# Patient Record
Sex: MALE | Race: OTHER | NOT HISPANIC OR LATINO | URBAN - METROPOLITAN AREA
[De-identification: names, ages, dates, MRNs, and addresses within clinical notes are randomized per-mention and may not be internally consistent; named-entity substitution may affect disease eponyms.]

---

## 2019-03-20 ENCOUNTER — EMERGENCY (EMERGENCY)
Facility: HOSPITAL | Age: 64
LOS: 1 days | Discharge: HOME | End: 2019-03-20
Attending: EMERGENCY MEDICINE
Payer: COMMERCIAL

## 2019-03-20 VITALS
RESPIRATION RATE: 18 BRPM | OXYGEN SATURATION: 96 % | WEIGHT: 229.94 LBS | HEART RATE: 78 BPM | DIASTOLIC BLOOD PRESSURE: 64 MMHG | SYSTOLIC BLOOD PRESSURE: 132 MMHG | TEMPERATURE: 98 F | HEIGHT: 74 IN

## 2019-03-20 VITALS
TEMPERATURE: 99 F | DIASTOLIC BLOOD PRESSURE: 89 MMHG | OXYGEN SATURATION: 96 % | SYSTOLIC BLOOD PRESSURE: 136 MMHG | HEART RATE: 73 BPM | RESPIRATION RATE: 18 BRPM

## 2019-03-20 LAB
ALBUMIN SERPL ELPH-MCNC: 4.5 G/DL — SIGNIFICANT CHANGE UP (ref 3.3–5)
ALP SERPL-CCNC: 88 U/L — SIGNIFICANT CHANGE UP (ref 40–120)
ALT FLD-CCNC: 47 U/L — HIGH (ref 10–45)
ANION GAP SERPL CALC-SCNC: 13 MMOL/L — SIGNIFICANT CHANGE UP (ref 5–17)
AST SERPL-CCNC: 42 U/L — HIGH (ref 10–40)
BASOPHILS # BLD AUTO: 0 K/UL — SIGNIFICANT CHANGE UP (ref 0–0.2)
BASOPHILS NFR BLD AUTO: 0.5 % — SIGNIFICANT CHANGE UP (ref 0–2)
BILIRUB SERPL-MCNC: 0.2 MG/DL — SIGNIFICANT CHANGE UP (ref 0.2–1.2)
BUN SERPL-MCNC: 13 MG/DL — SIGNIFICANT CHANGE UP (ref 7–23)
CALCIUM SERPL-MCNC: 9.7 MG/DL — SIGNIFICANT CHANGE UP (ref 8.4–10.5)
CHLORIDE SERPL-SCNC: 100 MMOL/L — SIGNIFICANT CHANGE UP (ref 96–108)
CO2 SERPL-SCNC: 25 MMOL/L — SIGNIFICANT CHANGE UP (ref 22–31)
CREAT SERPL-MCNC: 0.66 MG/DL — SIGNIFICANT CHANGE UP (ref 0.5–1.3)
EOSINOPHIL # BLD AUTO: 0.2 K/UL — SIGNIFICANT CHANGE UP (ref 0–0.5)
EOSINOPHIL NFR BLD AUTO: 4.2 % — SIGNIFICANT CHANGE UP (ref 0–6)
GLUCOSE SERPL-MCNC: 202 MG/DL — HIGH (ref 70–99)
HCT VFR BLD CALC: 41.5 % — SIGNIFICANT CHANGE UP (ref 39–50)
HGB BLD-MCNC: 14.6 G/DL — SIGNIFICANT CHANGE UP (ref 13–17)
LYMPHOCYTES # BLD AUTO: 2 K/UL — SIGNIFICANT CHANGE UP (ref 1–3.3)
LYMPHOCYTES # BLD AUTO: 36.5 % — SIGNIFICANT CHANGE UP (ref 13–44)
MCHC RBC-ENTMCNC: 31.1 PG — SIGNIFICANT CHANGE UP (ref 27–34)
MCHC RBC-ENTMCNC: 35.2 GM/DL — SIGNIFICANT CHANGE UP (ref 32–36)
MCV RBC AUTO: 88.6 FL — SIGNIFICANT CHANGE UP (ref 80–100)
MONOCYTES # BLD AUTO: 0.5 K/UL — SIGNIFICANT CHANGE UP (ref 0–0.9)
MONOCYTES NFR BLD AUTO: 8.8 % — SIGNIFICANT CHANGE UP (ref 2–14)
NEUTROPHILS # BLD AUTO: 2.7 K/UL — SIGNIFICANT CHANGE UP (ref 1.8–7.4)
NEUTROPHILS NFR BLD AUTO: 50 % — SIGNIFICANT CHANGE UP (ref 43–77)
PLATELET # BLD AUTO: 183 K/UL — SIGNIFICANT CHANGE UP (ref 150–400)
POTASSIUM SERPL-MCNC: 5 MMOL/L — SIGNIFICANT CHANGE UP (ref 3.5–5.3)
POTASSIUM SERPL-SCNC: 5 MMOL/L — SIGNIFICANT CHANGE UP (ref 3.5–5.3)
PROT SERPL-MCNC: 8.1 G/DL — SIGNIFICANT CHANGE UP (ref 6–8.3)
RBC # BLD: 4.69 M/UL — SIGNIFICANT CHANGE UP (ref 4.2–5.8)
RBC # FLD: 12.8 % — SIGNIFICANT CHANGE UP (ref 10.3–14.5)
SODIUM SERPL-SCNC: 138 MMOL/L — SIGNIFICANT CHANGE UP (ref 135–145)
TROPONIN T, HIGH SENSITIVITY RESULT: <6 NG/L — SIGNIFICANT CHANGE UP (ref 0–51)
WBC # BLD: 5.4 K/UL — SIGNIFICANT CHANGE UP (ref 3.8–10.5)
WBC # FLD AUTO: 5.4 K/UL — SIGNIFICANT CHANGE UP (ref 3.8–10.5)

## 2019-03-20 PROCEDURE — 70450 CT HEAD/BRAIN W/O DYE: CPT | Mod: 26

## 2019-03-20 PROCEDURE — 85027 COMPLETE CBC AUTOMATED: CPT

## 2019-03-20 PROCEDURE — 93005 ELECTROCARDIOGRAM TRACING: CPT

## 2019-03-20 PROCEDURE — 70450 CT HEAD/BRAIN W/O DYE: CPT

## 2019-03-20 PROCEDURE — 71250 CT THORAX DX C-: CPT | Mod: 26

## 2019-03-20 PROCEDURE — 99284 EMERGENCY DEPT VISIT MOD MDM: CPT | Mod: 25

## 2019-03-20 PROCEDURE — 71250 CT THORAX DX C-: CPT

## 2019-03-20 PROCEDURE — 84484 ASSAY OF TROPONIN QUANT: CPT

## 2019-03-20 PROCEDURE — 80053 COMPREHEN METABOLIC PANEL: CPT

## 2019-03-20 PROCEDURE — 93010 ELECTROCARDIOGRAM REPORT: CPT

## 2019-03-20 RX ORDER — VALACYCLOVIR 500 MG/1
1 TABLET, FILM COATED ORAL
Qty: 21 | Refills: 0 | OUTPATIENT
Start: 2019-03-20 | End: 2019-03-26

## 2019-03-20 RX ORDER — ACETAMINOPHEN 500 MG
975 TABLET ORAL ONCE
Qty: 0 | Refills: 0 | Status: COMPLETED | OUTPATIENT
Start: 2019-03-20 | End: 2019-03-20

## 2019-03-20 RX ADMIN — Medication 975 MILLIGRAM(S): at 18:32

## 2019-03-20 NOTE — ED PROVIDER NOTE - CARE PLAN
Principal Discharge DX:	Facial weakness  Assessment and plan of treatment:	Take prednisone 60mg (three 20mg tabs) once daily plus valacyclovir 1000mg three times daily for one week.  Take your home medications as prescribed.   Follow up with your Primary Care Provider upon discharge.   Return to ER for any new/worsening chest pain, shortness of breath, weakness, numbness, vision changes, trouble speaking/walking, new/worsening headache, or any other concerns.  Secondary Diagnosis:	Bell palsy

## 2019-03-20 NOTE — ED PROVIDER NOTE - NSFOLLOWUPINSTRUCTIONS_ED_ALL_ED_FT
Take prednisone 60mg (three 20mg tabs) once daily plus valacyclovir 1000mg three times daily for one week.  Take your home medications as prescribed.   Follow up with your Primary Care Provider upon discharge. May follow up with [facility] as needed: call [phone] to make appointment.   Return to ER for any new/worsening chest pain, shortness of breath, weakness, numbness, vision changes, trouble speaking/walking, new/worsening headache, or any other concerns.

## 2019-03-20 NOTE — ED PROVIDER NOTE - PROGRESS NOTE DETAILS
Trop < 6. CTH and CT chest unremarkable. Will treat for Talisheek palsy and d/c to f/u with PMD. D/w Dr. Bronson. - IDRIS MagallanesC

## 2019-03-20 NOTE — ED PROVIDER NOTE - ATTENDING CONTRIBUTION TO CARE
62 yo male p/w L facial weakness/numbness/drooling since 11pm last night along with constant mild L chest pain radiating to L shoulder/neck/occiput.  Exam c/w Bell's palsy though does not have complete forehead involvement (able to partially furrow his brow).  5/5 in b/l U/LE, ambulatory without assistance, no dysmetria.  CT head unremarkable, CT chest unremarkable.  Pending trop -- will either clear based on single negative trop or delta trop rule-out, and if able to do so, will d/c with antivirals/steroids and have patient f/u with his PCP.  Not aortic dissection, not CVA, not Pancoast tumor, not PE. 62 yo male p/w L facial weakness/numbness/drooling since 11pm last night along with constant mild L chest pain radiating to L shoulder/neck/occiput.  Patient had URI symptoms 1-2 weeks prior to developing the facial weakness.  Exam c/w Bell's palsy; otherwise,  5/5 in b/l U/LE, ambulatory without assistance, no dysmetria.  CT head unremarkable, CT chest unremarkable.  Pending trop -- will either clear based on single negative trop or delta trop rule-out, and if able to do so, will d/c with antivirals/steroids and have patient f/u with his PCP.  Not aortic dissection, not CVA, not Pancoast tumor, not PE.

## 2019-03-20 NOTE — ED PROVIDER NOTE - OBJECTIVE STATEMENT
64yo M with PMH DM, HLD, prostate CA (on lupron) presenting with HA, L facial weakness, and CP since last night. Pt reports he was laying down when he felt L-sided dull occipital HA. Pt then noticed L facial droop and L eye tearing. Pt also reports onset of dull, constant, L sided CP radiating to L shoulder at the same time. Reports HA is currently 7/10 and CP 6/10, has not taken anything for pain. 64yo M with PMH DM, HLD, prostate CA (on lupron) presenting with HA, L facial weakness, and CP since last night. Pt reports he was laying down when he felt L-sided dull occipital HA. Pt then noticed L facial droop and L eye tearing. Pt also reports onset of dull, constant, L sided CP radiating to L shoulder at the same time,+ associated SOB. CP is nonexertional and not pleuritic. Reports HA is currently 7/10 and CP 6/10, has not taken anything for pain. Also reports he feels lightheaded/unsteady when walking. Of note, reports he had a "cold" last week with cough for 3 days which has resolved. Denies any fever/chills, vision changes, numbness/tingling, abd pain, n/v.     PMD Guera Petersen 64yo M with PMH DM, HLD, prostate CA (on lupron) presenting with HA, L facial weakness, and CP since last night. Pt reports he was laying down when he felt L-sided dull occipital HA. Pt then noticed L facial droop and L eye tearing. Pt also reports onset of dull, constant, L sided CP radiating to L shoulder at the same time,+ associated SOB. CP is nonexertional and not pleuritic. Reports HA is currently 7/10 and CP 6/10, has not taken anything for pain. Also reports he feels lightheaded. Of note, reports he had a "cold" last week with cough for 3 days which has resolved. Denies any fever/chills, vision changes, numbness/tingling, abd pain, n/v.     PMD Guera Petersen

## 2019-03-20 NOTE — ED ADULT NURSE NOTE - OBJECTIVE STATEMENT
64 y/o M, A&Ox4, enters ED w/ c/o chest pain, headache and L. sided facial droop/eye droop since last night. Pt. reports he developed L. sided facial/eye droop accompanied by eye tearing, L. sided occipital headache and chest pain which pt. describes as "constant and dull" w/ radiation to the L. upper back and L. shoulder which started last night when pt. was laying down. Pt. reports chest pain and headache still persist. Nothing makes it better or worse. No SOB. No leg swelling/leg pain. Lung sounds clear bilaterally. No recent travel. Pt. does however endorse dry cough. No fever/chills. Pt. presents w/ L. sided facial/eye droop still present. No numbness/tingling. Positive strength and sensation to all extremities. Pupils 3 mm in size, PERRL. No abdominal pain. No n/v. Skin warm, dry and intact. EKG done. Pt. placed on CM - NSR to the 70s. Safety and comfort provided. Family at bedside. 62 y/o M, A&Ox4, enters ED w/ c/o chest pain, headache and L. sided facial droop/eye droop since last night. Pt. reports he developed L. sided facial/eye droop accompanied by eye tearing, L. sided occipital headache and chest pain which pt. describes as "constant and dull" w/ radiation to the L. upper back and L. shoulder which started last night when pt. was laying down. Pt. reports chest pain and headache still persist. Nothing makes it better or worse. No SOB. No leg swelling/leg pain. Lung sounds clear bilaterally. No recent travel. Pt. does however endorse dry cough. No fever/chills. Pt. presents w/ L. sided facial/eye droop still present. No numbness/tingling. Positive strength and sensation to all extremities. Pupils 3 mm in size, PERRL. No abdominal pain. No n/v. Skin warm, dry and intact. EKG done. Pt. placed on CM - NSR to the 70s. Neuro flow sheet initiated and in chart. Dysphagia screen completed and documented in [facility] - pt. passed. Safety and comfort provided. Family at bedside.

## 2019-03-20 NOTE — ED PROVIDER NOTE - PLAN OF CARE
Take prednisone 60mg (three 20mg tabs) once daily plus valacyclovir 1000mg three times daily for one week.  Take your home medications as prescribed.   Follow up with your Primary Care Provider upon discharge.   Return to ER for any new/worsening chest pain, shortness of breath, weakness, numbness, vision changes, trouble speaking/walking, new/worsening headache, or any other concerns.

## 2019-09-09 NOTE — ED PROVIDER NOTE - NS ED MD EM SELECTION
Sepsis     To treat sepsis, antibiotics and fluids may by given through an intravenous (IV) line.     Sepsis happens when your body responds with widespread inflammation to a bad infection or bacteremia--the presence of bacteria in your bloodstream. Sepsis can be deadly. Blood pressure may drop and the lungs and kidneys may start to fail. Emergency care for sepsis is crucial.  Risk factors  Those most at risk for sepsis are:  · Infants or older adults  · People who have an illness that weakens their immune system, such as cancer, AIDS, or diabetes  · People being treated with chemotherapy medicines or radiation, which weakens the immune system  · People who have had a transplant  · People with a very severe infection such as pneumonia, meningitis, or a urinary tract infection  When to go to the emergency department (ED)  Sepsis is an emergency. Go to the nearest ED if you have a fever with any of these symptoms:  · Chills and shaking  · Rapid heartbeat and breathing  · Trouble breathing  · Severe nausea or uncontrolled vomiting  · Confusion, disorientation, drowsiness, or dizziness  · Decreased urination  · Severe pain, including in the back or joints   What to expect in the ED  · Blood and urine tests are done to look for bacteria. They also check for organ failure.  · Blood, urine, or sputum cultures may be taken. The samples are sent to a lab. They are placed in a special container. Any bacteria should grow in 24 hours.  · X-rays or other imaging tests may be done.  A person with sepsis will be admitted to the hospital and treated with antibiotics. Treatment may also include oxygen and intravenous fluids.  Date Last Reviewed: 10/1/2016  © 8437-0187 [employer]. [address] [place] [address], [place] [address]. All rights reserved. This information is not intended as a substitute for professional medical care. Always follow your healthcare professional's instructions.        
[id number] Comprehensive

## 2022-02-02 ENCOUNTER — RESULT REVIEW (OUTPATIENT)
Age: 67
End: 2022-02-02

## 2022-04-29 ENCOUNTER — OUTPATIENT (OUTPATIENT)
Dept: OUTPATIENT SERVICES | Facility: HOSPITAL | Age: 67
LOS: 1 days | End: 2022-04-29

## 2022-04-29 ENCOUNTER — APPOINTMENT (OUTPATIENT)
Dept: DISASTER EMERGENCY | Facility: HOSPITAL | Age: 67
End: 2022-04-29

## 2022-04-29 ENCOUNTER — TRANSCRIPTION ENCOUNTER (OUTPATIENT)
Age: 67
End: 2022-04-29

## 2022-04-29 VITALS
SYSTOLIC BLOOD PRESSURE: 148 MMHG | HEART RATE: 91 BPM | OXYGEN SATURATION: 96 % | TEMPERATURE: 98 F | WEIGHT: 220.02 LBS | HEIGHT: 72 IN | DIASTOLIC BLOOD PRESSURE: 77 MMHG | RESPIRATION RATE: 19 BRPM

## 2022-04-29 VITALS
DIASTOLIC BLOOD PRESSURE: 74 MMHG | SYSTOLIC BLOOD PRESSURE: 146 MMHG | TEMPERATURE: 99 F | RESPIRATION RATE: 18 BRPM | OXYGEN SATURATION: 97 % | HEART RATE: 87 BPM

## 2022-04-29 DIAGNOSIS — U07.1 COVID-19: ICD-10-CM

## 2022-04-29 RX ORDER — BEBTELOVIMAB 87.5 MG/ML
175 INJECTION, SOLUTION INTRAVENOUS ONCE
Refills: 0 | Status: COMPLETED | OUTPATIENT
Start: 2022-04-29 | End: 2022-04-29

## 2022-04-29 RX ADMIN — BEBTELOVIMAB 175 MILLIGRAM(S): 87.5 INJECTION, SOLUTION INTRAVENOUS at 14:45

## 2022-04-29 NOTE — MONOCLONAL ANTIBODY INFUSION - HOME MEDICATIONS
predniSONE 20 mg oral tablet , 3 tab(s) orally once a day   valACYclovir 1 g oral tablet , 1 tab(s) orally every 8 hours

## 2022-04-29 NOTE — MONOCLONAL ANTIBODY INFUSION - EXAM
CC: Monoclonal Antibody Infusion/COVID 19 Positive  66yMale with DM, HTN,, stage IV metastatic prostate cancer    exam/findings:  T(C): 36.7 (04-29-22 @ 15:00), Max: 36.9 (04-29-22 @ 14:37)  HR: 86 (04-29-22 @ 15:00) (86 - 91)  BP: 149/78 (04-29-22 @ 15:00) (148/77 - 149/78)  RR: 18 (04-29-22 @ 15:00) (18 - 19)  SpO2: 98% (04-29-22 @ 15:00) (96% - 98%)      PE:   Appearance: NAD	  HEENT:   Normal oral mucosa,   Lymphatic: No lymphadenopathy  Cardiovascular: Normal S1 S2, No JVD, No murmurs, No edema  Respiratory: Lungs clear to auscultation	  Gastrointestinal:  Soft, Non-tender, + BS	  Skin: warm and dry  Neurologic: Non-focal  Extremities: Normal range of motion

## 2022-04-29 NOTE — MONOCLONAL ANTIBODY INFUSION - ASSESSMENT AND PLAN
ASSESSMENT:  Pt is a -66 years old male with -Covid + 4/28/22 referred by Dr. Archuleta who presents to infusion center for Monoclonal antibody infusion (Bebtelovimab). Patient is vaccinated and boosted with moderna.  Symptoms/ Criteria:  cough, tiredness  Risk Profile includes: DM, HTN, state IV prostate cancer    PLAN:  - infusion procedure explained to patient   - Consent for monoclonal antibody infusion obtained   - Risk & benefits discussed/all questions answered  - Bebtelovimab 175mg IVP over 30 seconds  - observe patient for one hour post infusion and discharge home

## 2023-06-25 ENCOUNTER — NON-APPOINTMENT (OUTPATIENT)
Age: 68
End: 2023-06-25

## 2023-12-24 ENCOUNTER — NON-APPOINTMENT (OUTPATIENT)
Age: 68
End: 2023-12-24

## 2024-02-02 ENCOUNTER — NON-APPOINTMENT (OUTPATIENT)
Age: 69
End: 2024-02-02

## 2024-03-01 ENCOUNTER — NON-APPOINTMENT (OUTPATIENT)
Age: 69
End: 2024-03-01

## 2024-10-10 ENCOUNTER — EMERGENCY (EMERGENCY)
Facility: HOSPITAL | Age: 69
LOS: 1 days | Discharge: HOME | End: 2024-10-10
Attending: EMERGENCY MEDICINE | Admitting: EMERGENCY MEDICINE
Payer: COMMERCIAL

## 2024-10-10 VITALS
SYSTOLIC BLOOD PRESSURE: 103 MMHG | TEMPERATURE: 98 F | HEIGHT: 72 IN | HEART RATE: 99 BPM | RESPIRATION RATE: 17 BRPM | WEIGHT: 182.98 LBS | DIASTOLIC BLOOD PRESSURE: 68 MMHG | OXYGEN SATURATION: 96 %

## 2024-10-10 LAB
APTT BLD: 30.6 SEC — SIGNIFICANT CHANGE UP (ref 24.5–35.6)
BASOPHILS # BLD AUTO: 0.03 K/UL — SIGNIFICANT CHANGE UP (ref 0–0.2)
BASOPHILS NFR BLD AUTO: 0.3 % — SIGNIFICANT CHANGE UP (ref 0–2)
BLOOD GAS VENOUS COMPREHENSIVE RESULT: SIGNIFICANT CHANGE UP
EOSINOPHIL # BLD AUTO: 0.02 K/UL — SIGNIFICANT CHANGE UP (ref 0–0.5)
EOSINOPHIL NFR BLD AUTO: 0.2 % — SIGNIFICANT CHANGE UP (ref 0–6)
HCT VFR BLD CALC: 27.5 % — LOW (ref 39–50)
HGB BLD-MCNC: 9 G/DL — LOW (ref 13–17)
IANC: 9.61 K/UL — HIGH (ref 1.8–7.4)
IMM GRANULOCYTES NFR BLD AUTO: 1.5 % — HIGH (ref 0–0.9)
INR BLD: 1.11 RATIO — SIGNIFICANT CHANGE UP (ref 0.85–1.16)
LYMPHOCYTES # BLD AUTO: 0.45 K/UL — LOW (ref 1–3.3)
LYMPHOCYTES # BLD AUTO: 4.1 % — LOW (ref 13–44)
MCHC RBC-ENTMCNC: 30.3 PG — SIGNIFICANT CHANGE UP (ref 27–34)
MCHC RBC-ENTMCNC: 32.7 GM/DL — SIGNIFICANT CHANGE UP (ref 32–36)
MCV RBC AUTO: 92.6 FL — SIGNIFICANT CHANGE UP (ref 80–100)
MONOCYTES # BLD AUTO: 0.73 K/UL — SIGNIFICANT CHANGE UP (ref 0–0.9)
MONOCYTES NFR BLD AUTO: 6.6 % — SIGNIFICANT CHANGE UP (ref 2–14)
NEUTROPHILS # BLD AUTO: 9.61 K/UL — HIGH (ref 1.8–7.4)
NEUTROPHILS NFR BLD AUTO: 87.3 % — HIGH (ref 43–77)
NRBC # BLD: 0 /100 WBCS — SIGNIFICANT CHANGE UP (ref 0–0)
NRBC # FLD: 0 K/UL — SIGNIFICANT CHANGE UP (ref 0–0)
PLATELET # BLD AUTO: 175 K/UL — SIGNIFICANT CHANGE UP (ref 150–400)
PROTHROM AB SERPL-ACNC: 12.9 SEC — SIGNIFICANT CHANGE UP (ref 9.9–13.4)
RBC # BLD: 2.97 M/UL — LOW (ref 4.2–5.8)
RBC # FLD: 15.3 % — HIGH (ref 10.3–14.5)
WBC # BLD: 11.01 K/UL — HIGH (ref 3.8–10.5)
WBC # FLD AUTO: 11.01 K/UL — HIGH (ref 3.8–10.5)

## 2024-10-10 PROCEDURE — 99285 EMERGENCY DEPT VISIT HI MDM: CPT

## 2024-10-10 PROCEDURE — 71046 X-RAY EXAM CHEST 2 VIEWS: CPT | Mod: 26

## 2024-10-10 NOTE — ED ADULT NURSE NOTE - NSFALLUNIVINTERV_ED_ALL_ED
Bed/Stretcher in lowest position, wheels locked, appropriate side rails in place/Call bell, personal items and telephone in reach/Instruct patient to call for assistance before getting out of bed/chair/stretcher/Non-slip footwear applied when patient is off stretcher/[place] to call system/Physically safe environment - no spills, clutter or unnecessary equipment/Purposeful proactive rounding/Room/bathroom lighting operational, light cord in reach

## 2024-10-10 NOTE — ED PROVIDER NOTE - PROGRESS NOTE DETAILS
Chante Kowalski, PGY-1: Discussed with patient results of work up, strict return precautions, and need for follow up.  Patient and family expressed understanding and agrees with plan. Chante Kowalski, PGY-1: Patient re-assessed.  Vitals stable.  Test results explained to patient including labs, and CT that show patient likely has cystitis. Pending urine.

## 2024-10-10 NOTE — ED PROVIDER NOTE - CLINICAL SUMMARY MEDICAL DECISION MAKING FREE TEXT BOX
Patient is a 69 year old male with past medical history of stage IV metastatic prostate cancer to the bone presenting to the emergency department today for evaluation after having a fever measured at home of 103.4 at 20:00 today.     Patient with bilateral nephrostomy tubes in place, concern for UTI as source of infection vs PNA vs chemotherapy reaction as source of fever. Patient is not septic and well appearing on examination.    Plan: CXR, EKG, labs, UA and UCx for each nephrostomy tube, CTAP, antibiotics

## 2024-10-10 NOTE — ED PROVIDER NOTE - NSFOLLOWUPINSTRUCTIONS_ED_ALL_ED_FT
Health Maintenance Due   Topic Date Due   • DTaP/Tdap/Td Vaccine (1 - Tdap) 11/03/1977   • Cervical Cancer Screening HPV CO-Testing  11/03/1988   • Shingles Vaccine (1 of 2) 11/03/2008   • Influenza Vaccine (1) 09/01/2020   • Depression Screening  01/30/2021       Patient is due for the topics as listed above and wishes to proceed with them.         Vaccine Information Statement(s) was given today. This has been reviewed, questions answered, and verbal consent given by Patient for injection(s) and administration of Influenza (Inactivated) and Tetanus/Diphtheria/Pertussis (Tdap).    Patient tolerated without incident. See immunization grid for documentation.         You have been evaluated in the Emergency Department today for your urinary symptoms. Your evaluation, including urinalysis, suggests that your symptoms are due to a urinary tract infection. Please take your prescribed antibiotics for the full course of the medication as directed and follow up with your urologist as soon as possible.    Please follow up with your primary care physician within two days.    Return to the Emergency Department if you experience fevers 100.4° or greater, worsening or uncontrolled pain, vomiting, flank pain, or for any other concerning symptoms.

## 2024-10-10 NOTE — ED ADULT TRIAGE NOTE - CHIEF COMPLAINT QUOTE
Pt arrives to ED c/o fever 103.4 at 20:00.  Pt took Tylenol 1000mg at 20:00.  Stage 4 prostate cancer with metastasis  patient on chemo starting 3 weeks ago with last treatment 9/24/24 .  Pain to clavicle cancer present there).Hx: DM2, HTN, HLD, double nephrostomies with monteiro bags. fs= 197

## 2024-10-10 NOTE — ED ADULT NURSE NOTE - OBJECTIVE STATEMENT
Pt arrives to ED A&Ox4, ambulatory at baseline, family at bedside. pt is primarily Italian speaking. Pt C/O fever of 103 around 8om tonight. Pt states he took tylenol around 8pm. PMHx of stage 4 prostate cancer, last chemo treatment 9/24/24, DM2, HTN, HLD, double nephrostomies with leg bags, and anemic. Pt endorsing pain to neck radiating to clavicle. Pt states they are a Jehovahs witness. Pt rectal temp is 99.4. Respirations are even and unlabored, NSR on monitor, abdomen is soft and nondistended, nephrostomy sites are clean, dressed, and intact. Capillary refill is 2 seconds bilaterally. 20G IV placed in left forearm. Labs drawn and sent. Cultures sent. Bed in lowest position, safety maintained. Report given to primary RN Indira.

## 2024-10-10 NOTE — ED PROVIDER NOTE - PATIENT PORTAL LINK FT
You can access the FollowMyHealth Patient Portal offered by [facility] by registering at the following website: [email or web address]. By joining [facility]’s FollowMyHealth portal, you will also be able to view your health information using other applications (apps) compatible with our system.

## 2024-10-10 NOTE — ED PROVIDER NOTE - OBJECTIVE STATEMENT
Patient is a 69 year old male with past medical history of stage IV metastatic prostate cancer to the bone presenting to the emergency department today for evaluation after having a fever measured at home of 103.4 at 20:00 today. Patient is accompanied by family who aided in providing history. Per daughter, patient started feeling tired at 1200 today then started having chills at 1700 today. After measuring a fever of 103.4, patient took 1000mg acetaminophen and rechecked the temperature 20 minutes later and it was 103.2 at that time. Patient is a 69 year old male with past medical history of stage IV metastatic prostate cancer to the bone presenting to the emergency department today for evaluation after having a fever measured at home of 103.4 at 20:00 today. Patient is accompanied by family who aided in providing history. Per daughter, patient started feeling tired at 1200 today then started having chills at 1700 today. After measuring a fever of 103.4, patient took 1000mg acetaminophen and rechecked the temperature 20 minutes later and it was 103.2 at that time. Patient received his most recent dose of chemotherapy (docetaxel) on September 24. He has bilateral nephrostomy tubes in place and a right ureteral stent.

## 2024-10-10 NOTE — ED PROVIDER NOTE - ATTENDING CONTRIBUTION TO CARE
Resident HPI" 69 year old male with past medical history of stage IV metastatic prostate cancer to the bone presenting to the emergency department today for evaluation after having a fever measured at home of 103.4 at 20:00 today. Patient is accompanied by family who aided in providing history. Per daughter, patient started feeling tired at 1200 today then started having chills at 1700 today. After measuring a fever of 103.4, patient took 1000mg acetaminophen and rechecked the temperature 20 minutes later and it was 103.2 at that time. Patient received his most recent dose of chemotherapy (docetaxel) on September 24. He has bilateral nephrostomy tubes in place and a right ureteral stent."  Exceptions: None   VSS  PE as described   Labs pending   Plan fever in patient with clear risk factors for  source of fever although viral etiology still a possibility   plan  IVFS  rectal temp   labs lytes creatinine   ct  reassess  dispo as per results and response

## 2024-10-10 NOTE — ED PROVIDER NOTE - PHYSICAL EXAMINATION
Vital signs reviewed.  CONSTITUTIONAL: NAD   HEAD: Normocephalic; atraumatic  EYES: EOMI, PERRL, no conjunctival injection, no scleral icterus  NECK: Trachea midline  CV: Normal S1, S2; no audible murmurs  RESP: normal work of breathing; CTAB   ABD: soft, non-distended; non-tender to palpation   MSK/EXT: no LE edema, no limited ROM  SKIN: No rashes on exposed skin surfaces. Skin around bilateral nephrostomy tubes not erythematous or edematous.  NEURO: Moves all extremities spontaneously with no focal deficits, speech is appropriate  PSYCH: well kempt, calm, interactive

## 2024-10-11 VITALS
DIASTOLIC BLOOD PRESSURE: 68 MMHG | RESPIRATION RATE: 16 BRPM | OXYGEN SATURATION: 100 % | SYSTOLIC BLOOD PRESSURE: 138 MMHG | TEMPERATURE: 99 F | HEART RATE: 74 BPM

## 2024-10-11 LAB
ALBUMIN SERPL ELPH-MCNC: 4 G/DL — SIGNIFICANT CHANGE UP (ref 3.3–5)
ALP SERPL-CCNC: 80 U/L — SIGNIFICANT CHANGE UP (ref 40–120)
ALT FLD-CCNC: 6 U/L — SIGNIFICANT CHANGE UP (ref 4–41)
ANION GAP SERPL CALC-SCNC: 10 MMOL/L — SIGNIFICANT CHANGE UP (ref 7–14)
APPEARANCE UR: ABNORMAL
APPEARANCE UR: ABNORMAL
AST SERPL-CCNC: 10 U/L — SIGNIFICANT CHANGE UP (ref 4–40)
BACTERIA # UR AUTO: ABNORMAL /HPF
BACTERIA # UR AUTO: ABNORMAL /HPF
BILIRUB SERPL-MCNC: 0.4 MG/DL — SIGNIFICANT CHANGE UP (ref 0.2–1.2)
BILIRUB UR-MCNC: NEGATIVE — SIGNIFICANT CHANGE UP
BILIRUB UR-MCNC: NEGATIVE — SIGNIFICANT CHANGE UP
BUN SERPL-MCNC: 22 MG/DL — SIGNIFICANT CHANGE UP (ref 7–23)
CALCIUM SERPL-MCNC: 9.5 MG/DL — SIGNIFICANT CHANGE UP (ref 8.4–10.5)
CAST: 2 /LPF — SIGNIFICANT CHANGE UP (ref 0–4)
CAST: 4 /LPF — SIGNIFICANT CHANGE UP (ref 0–4)
CHLORIDE SERPL-SCNC: 100 MMOL/L — SIGNIFICANT CHANGE UP (ref 98–107)
CO2 SERPL-SCNC: 25 MMOL/L — SIGNIFICANT CHANGE UP (ref 22–31)
COLOR SPEC: YELLOW — SIGNIFICANT CHANGE UP
COLOR SPEC: YELLOW — SIGNIFICANT CHANGE UP
CREAT SERPL-MCNC: 1.27 MG/DL — SIGNIFICANT CHANGE UP (ref 0.5–1.3)
DIFF PNL FLD: ABNORMAL
DIFF PNL FLD: ABNORMAL
EGFR: 60 ML/MIN/1.73M2 — SIGNIFICANT CHANGE UP
GLUCOSE SERPL-MCNC: 196 MG/DL — HIGH (ref 70–99)
GLUCOSE UR QL: NEGATIVE MG/DL — SIGNIFICANT CHANGE UP
GLUCOSE UR QL: NEGATIVE MG/DL — SIGNIFICANT CHANGE UP
KETONES UR-MCNC: NEGATIVE MG/DL — SIGNIFICANT CHANGE UP
KETONES UR-MCNC: NEGATIVE MG/DL — SIGNIFICANT CHANGE UP
LEUKOCYTE ESTERASE UR-ACNC: ABNORMAL
LEUKOCYTE ESTERASE UR-ACNC: ABNORMAL
NITRITE UR-MCNC: POSITIVE
NITRITE UR-MCNC: POSITIVE
PH UR: 6 — SIGNIFICANT CHANGE UP (ref 5–8)
PH UR: 6.5 — SIGNIFICANT CHANGE UP (ref 5–8)
POTASSIUM SERPL-MCNC: 3.9 MMOL/L — SIGNIFICANT CHANGE UP (ref 3.5–5.3)
POTASSIUM SERPL-SCNC: 3.9 MMOL/L — SIGNIFICANT CHANGE UP (ref 3.5–5.3)
PROT SERPL-MCNC: 7.4 G/DL — SIGNIFICANT CHANGE UP (ref 6–8.3)
PROT UR-MCNC: 100 MG/DL
PROT UR-MCNC: 300 MG/DL
RBC CASTS # UR COMP ASSIST: 2 /HPF — SIGNIFICANT CHANGE UP (ref 0–4)
RBC CASTS # UR COMP ASSIST: 42 /HPF — HIGH (ref 0–4)
SODIUM SERPL-SCNC: 135 MMOL/L — SIGNIFICANT CHANGE UP (ref 135–145)
SP GR SPEC: 1.02 — SIGNIFICANT CHANGE UP (ref 1–1.03)
SP GR SPEC: 1.03 — SIGNIFICANT CHANGE UP (ref 1–1.03)
SQUAMOUS # UR AUTO: 1 /HPF — SIGNIFICANT CHANGE UP (ref 0–5)
SQUAMOUS # UR AUTO: 2 /HPF — SIGNIFICANT CHANGE UP (ref 0–5)
UROBILINOGEN FLD QL: 0.2 MG/DL — SIGNIFICANT CHANGE UP (ref 0.2–1)
UROBILINOGEN FLD QL: 0.2 MG/DL — SIGNIFICANT CHANGE UP (ref 0.2–1)
WBC UR QL: 119 /HPF — HIGH (ref 0–5)
WBC UR QL: 25 /HPF — HIGH (ref 0–5)

## 2024-10-11 PROCEDURE — 74177 CT ABD & PELVIS W/CONTRAST: CPT | Mod: 26,MC

## 2024-10-11 PROCEDURE — 93010 ELECTROCARDIOGRAM REPORT: CPT

## 2024-10-11 RX ORDER — CEFTRIAXONE SODIUM 1 G
1000 VIAL (EA) INJECTION ONCE
Refills: 0 | Status: COMPLETED | OUTPATIENT
Start: 2024-10-11 | End: 2024-10-11

## 2024-10-11 RX ADMIN — Medication 100 MILLIGRAM(S): at 03:02

## 2024-10-11 NOTE — ED ADULT NURSE REASSESSMENT NOTE - NS ED NURSE REASSESS COMMENT FT1
Pt resting in stretcher, A&O x 4, offers no complaints of pain or discomfort, RR equal and unlabored, medicated as per EMAR, urine collected from bilateral nephrostomy, safety maintained, comfort provided, care plan ongoing.

## 2024-10-13 LAB
-  AMOXICILLIN/CLAVULANIC ACID: SIGNIFICANT CHANGE UP
-  AMPICILLIN/SULBACTAM: SIGNIFICANT CHANGE UP
-  AMPICILLIN: SIGNIFICANT CHANGE UP
-  AMPICILLIN: SIGNIFICANT CHANGE UP
-  AZTREONAM: SIGNIFICANT CHANGE UP
-  CEFAZOLIN: SIGNIFICANT CHANGE UP
-  CEFEPIME: SIGNIFICANT CHANGE UP
-  CEFOXITIN: SIGNIFICANT CHANGE UP
-  CEFTRIAXONE: SIGNIFICANT CHANGE UP
-  CIPROFLOXACIN: SIGNIFICANT CHANGE UP
-  CIPROFLOXACIN: SIGNIFICANT CHANGE UP
-  ERTAPENEM: SIGNIFICANT CHANGE UP
-  GENTAMICIN: SIGNIFICANT CHANGE UP
-  LEVOFLOXACIN: SIGNIFICANT CHANGE UP
-  LEVOFLOXACIN: SIGNIFICANT CHANGE UP
-  MEROPENEM: SIGNIFICANT CHANGE UP
-  PIPERACILLIN/TAZOBACTAM: SIGNIFICANT CHANGE UP
-  TOBRAMYCIN: SIGNIFICANT CHANGE UP
-  TRIMETHOPRIM/SULFAMETHOXAZOLE: SIGNIFICANT CHANGE UP
-  VANCOMYCIN: SIGNIFICANT CHANGE UP
CULTURE RESULTS: ABNORMAL
METHOD TYPE: SIGNIFICANT CHANGE UP
METHOD TYPE: SIGNIFICANT CHANGE UP
ORGANISM # SPEC MICROSCOPIC CNT: ABNORMAL
SPECIMEN SOURCE: SIGNIFICANT CHANGE UP

## 2024-10-15 LAB
-  AMOXICILLIN/CLAVULANIC ACID: SIGNIFICANT CHANGE UP
-  AMPICILLIN/SULBACTAM: SIGNIFICANT CHANGE UP
-  AMPICILLIN: SIGNIFICANT CHANGE UP
-  AZTREONAM: SIGNIFICANT CHANGE UP
-  CEFAZOLIN: SIGNIFICANT CHANGE UP
-  CEFEPIME: SIGNIFICANT CHANGE UP
-  CEFOXITIN: SIGNIFICANT CHANGE UP
-  CEFTRIAXONE: SIGNIFICANT CHANGE UP
-  CIPROFLOXACIN: SIGNIFICANT CHANGE UP
-  ERTAPENEM: SIGNIFICANT CHANGE UP
-  GENTAMICIN: SIGNIFICANT CHANGE UP
-  LEVOFLOXACIN: SIGNIFICANT CHANGE UP
-  MEROPENEM: SIGNIFICANT CHANGE UP
-  NITROFURANTOIN: SIGNIFICANT CHANGE UP
-  PIPERACILLIN/TAZOBACTAM: SIGNIFICANT CHANGE UP
-  TOBRAMYCIN: SIGNIFICANT CHANGE UP
-  TRIMETHOPRIM/SULFAMETHOXAZOLE: SIGNIFICANT CHANGE UP
METHOD TYPE: SIGNIFICANT CHANGE UP

## 2024-10-16 LAB
-  AMPICILLIN: SIGNIFICANT CHANGE UP
-  CIPROFLOXACIN: SIGNIFICANT CHANGE UP
-  LEVOFLOXACIN: SIGNIFICANT CHANGE UP
-  NITROFURANTOIN: SIGNIFICANT CHANGE UP
-  TETRACYCLINE: SIGNIFICANT CHANGE UP
-  VANCOMYCIN: SIGNIFICANT CHANGE UP
CULTURE RESULTS: ABNORMAL
CULTURE RESULTS: SIGNIFICANT CHANGE UP
CULTURE RESULTS: SIGNIFICANT CHANGE UP
METHOD TYPE: SIGNIFICANT CHANGE UP
ORGANISM # SPEC MICROSCOPIC CNT: ABNORMAL
SPECIMEN SOURCE: SIGNIFICANT CHANGE UP